# Patient Record
Sex: MALE | Race: WHITE | NOT HISPANIC OR LATINO | Employment: PART TIME | URBAN - NONMETROPOLITAN AREA
[De-identification: names, ages, dates, MRNs, and addresses within clinical notes are randomized per-mention and may not be internally consistent; named-entity substitution may affect disease eponyms.]

---
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
## 2022-02-25 ENCOUNTER — HOSPITAL ENCOUNTER (EMERGENCY)
Facility: HOSPITAL | Age: 22
Discharge: HOME | End: 2022-02-25
Admitting: EMERGENCY MEDICINE

## 2022-02-25 VITALS
WEIGHT: 238 LBS | RESPIRATION RATE: 20 BRPM | HEIGHT: 69 IN | HEART RATE: 88 BPM | BODY MASS INDEX: 35.25 KG/M2 | DIASTOLIC BLOOD PRESSURE: 92 MMHG | OXYGEN SATURATION: 99 % | SYSTOLIC BLOOD PRESSURE: 123 MMHG | TEMPERATURE: 97.8 F

## 2022-02-25 DIAGNOSIS — Z00.00 NORMAL EXAM: ICD-10-CM
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
## 2022-02-25 PROCEDURE — 99282 EMERGENCY DEPT VISIT SF MDM: CPT

## 2024-02-03 ENCOUNTER — OFFICE VISIT (OUTPATIENT)
Age: 24
End: 2024-02-03

## 2024-02-03 VITALS
HEART RATE: 91 BPM | RESPIRATION RATE: 18 BRPM | TEMPERATURE: 97.7 F | WEIGHT: 222 LBS | DIASTOLIC BLOOD PRESSURE: 72 MMHG | SYSTOLIC BLOOD PRESSURE: 112 MMHG | OXYGEN SATURATION: 98 %

## 2024-02-03 DIAGNOSIS — H61.22 IMPACTED CERUMEN OF LEFT EAR: Primary | ICD-10-CM

## 2024-02-03 ASSESSMENT — ENCOUNTER SYMPTOMS
EYE PAIN: 0
SINUS PRESSURE: 0
COLOR CHANGE: 0
SORE THROAT: 0
TROUBLE SWALLOWING: 0
CHEST TIGHTNESS: 0
ABDOMINAL DISTENTION: 0
EYE DISCHARGE: 0
WHEEZING: 0
COUGH: 0
STRIDOR: 0
ABDOMINAL PAIN: 0
SHORTNESS OF BREATH: 0

## 2024-02-03 NOTE — PROGRESS NOTES
[facility]  [facility]  [address] [place] [address] DRIVE  [facility] [id number]  Dept: [phone]  Dept Fax: [phone]  Loc: [phone]    Mani Engel is a 23 y.o. male who presents today for his medical conditions/complaints as noted below.  Mani Engel is complaining of Ear Fullness (Left )        HPI:   Ear Fullness   Pertinent negatives include no abdominal pain, coughing, neck pain, rash or sore throat.       Mani presents to the office complaining of left ear fullness and muffled hearing.  Symptoms started 1 week ago.  Denies pain or discharge.  Denies foreign body.     Past Medical History:   Diagnosis Date    Anxiety        No past surgical history on file.    Family History   Problem Relation Age of Onset    Cancer Brother        Social History     Tobacco Use    Smoking status: Never    Smokeless tobacco: Never   Substance Use Topics    Alcohol use: Not on file        Current Outpatient Medications   Medication Sig Dispense Refill    sertraline (ZOLOFT) 25 MG tablet Take 1.5 tablets by mouth daily. 45 tablet 0     No current facility-administered medications for this visit.       No Known Allergies    Health Maintenance   Topic Date Due    Hepatitis B vaccine (3 of 3 - 3-dose series) 06/16/2003    Depression Screen  Never done    HIV screen  Never done    Hepatitis C screen  Never done    DTaP/Tdap/Td vaccine (7 - Td or Tdap) 05/28/2023    Flu vaccine (1) 08/01/2023    COVID-19 Vaccine (3 - 2023-24 season) 09/01/2023    Hib vaccine  Completed    HPV vaccine  Completed    Polio vaccine  Completed    Varicella vaccine  Completed    Hepatitis A vaccine  Aged Out    Meningococcal (ACWY) vaccine  Aged Out    Pneumococcal 0-64 years Vaccine  Aged Out    Measles,Mumps,Rubella (MMR) vaccine  Discontinued       Subjective:   Review of Systems   Constitutional:  Negative for chills, fatigue and fever.   HENT:  Negative for congestion, sinus pressure, sore

## 2025-08-02 ENCOUNTER — HOSPITAL ENCOUNTER (EMERGENCY)
Age: 25
Discharge: HOME | End: 2025-08-02
Payer: COMMERCIAL

## 2025-08-02 ENCOUNTER — APPOINTMENT (OUTPATIENT)
Dept: CT IMAGING | Age: 25
End: 2025-08-02
Payer: COMMERCIAL

## 2025-08-02 VITALS
HEIGHT: 69 IN | WEIGHT: 220 LBS | HEART RATE: 94 BPM | TEMPERATURE: 97.6 F | RESPIRATION RATE: 16 BRPM | DIASTOLIC BLOOD PRESSURE: 78 MMHG | OXYGEN SATURATION: 96 % | BODY MASS INDEX: 32.58 KG/M2 | SYSTOLIC BLOOD PRESSURE: 129 MMHG

## 2025-08-02 DIAGNOSIS — R31.29 OTHER MICROSCOPIC HEMATURIA: ICD-10-CM

## 2025-08-02 DIAGNOSIS — N23 RENAL COLIC: ICD-10-CM

## 2025-08-02 DIAGNOSIS — I88.0 MESENTERIC ADENITIS: Primary | ICD-10-CM

## 2025-08-02 LAB
ALBUMIN SERPL-MCNC: 4.5 G/DL (ref 3.5–5.2)
ALP SERPL-CCNC: 64 U/L (ref 40–129)
ALT SERPL-CCNC: 26 U/L (ref 10–50)
ANION GAP SERPL CALCULATED.3IONS-SCNC: 13 MMOL/L (ref 8–16)
AST SERPL-CCNC: 28 U/L (ref 10–50)
BASOPHILS # BLD: 0 K/UL (ref 0–0.2)
BASOPHILS NFR BLD: 0.5 % (ref 0–1)
BILIRUB SERPL-MCNC: 0.7 MG/DL (ref 0.2–1.2)
BILIRUB UR QL STRIP: NEGATIVE
BUN SERPL-MCNC: 9 MG/DL (ref 6–20)
CALCIUM SERPL-MCNC: 9.8 MG/DL (ref 8.6–10)
CHLORIDE SERPL-SCNC: 104 MMOL/L (ref 98–107)
CLARITY UR: ABNORMAL
CO2 SERPL-SCNC: 24 MMOL/L (ref 22–29)
COARSE GRAN CASTS #/AREA URNS LPF: ABNORMAL /LPF (ref 0–5)
COLOR UR: ABNORMAL
CREAT SERPL-MCNC: 0.8 MG/DL (ref 0.7–1.2)
EOSINOPHIL # BLD: 0.2 K/UL (ref 0–0.6)
EOSINOPHIL NFR BLD: 2.2 % (ref 0–5)
ERYTHROCYTE [DISTWIDTH] IN BLOOD BY AUTOMATED COUNT: 12.6 % (ref 11.5–14.5)
FINE GRAN CASTS #/AREA URNS HPF: ABNORMAL /LPF (ref 0–2)
GLUCOSE SERPL-MCNC: 109 MG/DL (ref 70–99)
GLUCOSE UR STRIP.AUTO-MCNC: NEGATIVE MG/DL
HCT VFR BLD AUTO: 45.1 % (ref 42–52)
HGB BLD-MCNC: 15.2 G/DL (ref 14–18)
HGB UR STRIP.AUTO-MCNC: ABNORMAL MG/L
HYALINE CASTS #/AREA URNS LPF: ABNORMAL /LPF (ref 0–5)
IMM GRANULOCYTES # BLD: 0 K/UL
KETONES UR STRIP.AUTO-MCNC: ABNORMAL MG/DL
LEUKOCYTE ESTERASE UR QL STRIP.AUTO: ABNORMAL
LIPASE SERPL-CCNC: 25 U/L (ref 13–60)
LYMPHOCYTES # BLD: 1 K/UL (ref 1.1–4.5)
LYMPHOCYTES NFR BLD: 11.5 % (ref 20–40)
MCH RBC QN AUTO: 30.3 PG (ref 27–31)
MCHC RBC AUTO-ENTMCNC: 33.7 G/DL (ref 33–37)
MCV RBC AUTO: 90 FL (ref 80–94)
MONOCYTES # BLD: 0.6 K/UL (ref 0–0.9)
MONOCYTES NFR BLD: 6.7 % (ref 0–10)
MUCOUS THREADS URNS QL MICRO: ABNORMAL /LPF
NEUTROPHILS # BLD: 6.9 K/UL (ref 1.5–7.5)
NEUTS SEG NFR BLD: 78.6 % (ref 50–65)
NITRITE UR QL STRIP.AUTO: NEGATIVE
PH UR STRIP.AUTO: 6 [PH] (ref 5–8)
PLATELET # BLD AUTO: 248 K/UL (ref 130–400)
PMV BLD AUTO: 9.8 FL (ref 9.4–12.4)
POTASSIUM SERPL-SCNC: 3.7 MMOL/L (ref 3.5–5)
PROT SERPL-MCNC: 7.1 G/DL (ref 6.4–8.3)
PROT UR STRIP.AUTO-MCNC: 30 MG/DL
RBC # BLD AUTO: 5.01 M/UL (ref 4.7–6.1)
RBC #/AREA URNS HPF: ABNORMAL /HPF (ref 0–2)
SODIUM SERPL-SCNC: 141 MMOL/L (ref 136–145)
SP GR UR STRIP.AUTO: 1.02 (ref 1–1.03)
SQUAMOUS #/AREA URNS HPF: ABNORMAL /HPF
UROBILINOGEN UR STRIP.AUTO-MCNC: 1 E.U./DL
WBC # BLD AUTO: 8.8 K/UL (ref 4.8–10.8)
WBC #/AREA URNS HPF: ABNORMAL /HPF (ref 0–5)

## 2025-08-02 PROCEDURE — 85025 COMPLETE CBC W/AUTO DIFF WBC: CPT

## 2025-08-02 PROCEDURE — 36415 COLL VENOUS BLD VENIPUNCTURE: CPT

## 2025-08-02 PROCEDURE — 87086 URINE CULTURE/COLONY COUNT: CPT

## 2025-08-02 PROCEDURE — 80053 COMPREHEN METABOLIC PANEL: CPT

## 2025-08-02 PROCEDURE — 83690 ASSAY OF LIPASE: CPT

## 2025-08-02 PROCEDURE — 74176 CT ABD & PELVIS W/O CONTRAST: CPT

## 2025-08-02 PROCEDURE — 99284 EMERGENCY DEPT VISIT MOD MDM: CPT

## 2025-08-02 PROCEDURE — 81001 URINALYSIS AUTO W/SCOPE: CPT

## 2025-08-02 RX ORDER — ONDANSETRON 4 MG/1
4 TABLET, ORALLY DISINTEGRATING ORAL 3 TIMES DAILY PRN
Qty: 21 TABLET | Refills: 0 | Status: SHIPPED | OUTPATIENT
Start: 2025-08-02

## 2025-08-02 NOTE — ED PROVIDER NOTES
[facility] EMERGENCY DEPARTMENT  EMERGENCY DEPARTMENT ENCOUNTER      Pt Name: Mani Engel  MRN: [id number]  Birthdate 2000  Date of evaluation: 8/2/2025  Provider: Ijeoma Allison PA-C    CHIEF COMPLAINT       Chief Complaint   Patient presents with    Abdominal Pain     Left side abd pain onset this morning    Vomiting    Urinary Frequency     HISTORY OF PRESENT ILLNESS   (Location/Symptom, Timing/Onset,Context/Setting, Quality, Duration, Modifying Factors, Severity)  Note limiting factors.   HPI    Mani Engel is a 25 y.o. male with a past medical history significant for anxiety who presents to the emergency department with a chief complaint of left-sided abdominal pain, nausea, vomiting.  Patient states that shortly after waking up and using the restroom this morning he started to have a sharp stabbing pain in the left side of his abdomen.  He initially thought that it was gas pain, so he tried to strain to have a bowel movement, and it did not change anything, he was not able to have a bowel movement, and he continued to feel very poorly.  He started vomiting, had multiple episodes of vomiting this morning, and then they decided to come to the ER for further evaluation.  On route, he had to pull over because he had an overwhelming sense of urinary urgency, was able to pull over at a [employer], he vomited once there, and urinated a small amount, then everything started to get better.  His pain is now gone, he has not had any further episodes of vomiting, and he feels much improved.  They were concerned that maybe he had a kidney stone, but now they question if there is anything wrong at all because the symptoms went away.  Denies any known history of kidney stone, but he states he had a similar pain a couple of years ago, but it only lasted for a short time and he did not have the associated vomiting.    Nursing Notes were reviewed.    REVIEW OF SYSTEMS    (2-9 systems for level 4, 10 or more for  additional testing.    Patient did have white blood cell load, and though he does not have bacteria, will initiate Keflex out of an abundance of caution while awaiting urine culture.  He is advised to follow-up on this result on MyChart and if the culture comes back negative, he is encouraged to discontinue his antibiotic.    Patient remains feeling well, denies any pain, nausea, vomiting, and is ready for discharge at this time    See below for follow-up instructions and discharge medications.  Patient instructed to return to the ED for any new or worsening symptoms or concerns.  Discussed signs and symptoms most concerning that necessitate immediate return to the ED for reevaluation.  Patient verbalizes understanding of return precautions and follow-up recommendations.  Is given opportunity to ask any questions they may have.  Patient denies further question/comments/concerns, is understanding and agreeable to stated plan, and is discharged home in stable condition.     CONSULTS:  None    PROCEDURES:  Unless otherwise notedbelow, none     Procedures      FINAL IMPRESSION     1. Mesenteric adenitis    2. Renal colic    3. Other microscopic hematuria          DISPOSITION/PLAN   DISPOSITION Decision To Discharge 08/02/2025 11:37:22 AM   DISPOSITION CONDITION Stable           No notes of EC Admission Criteria type on file.    PATIENT REFERRED TO:  Jimmy Capone MD  [address] [facility] [address]  [place] [address]  [phone]    Schedule an appointment as soon as possible for a visit in 2 days      Junior Jones MD  [address] [place] [address]  [phone]    Schedule an appointment as soon as possible for a visit in 2 days      [facility] Emergency Department  [address] [place] [place] [address]  [phone]  Go to   If symptoms worsen      DISCHARGE MEDICATIONS:  New Prescriptions    CEPHALEXIN (KEFLEX) 250 MG CAPSULE    Take 2 capsules by mouth 4 times daily for 10 days    ONDANSETRON

## 2025-08-02 NOTE — DISCHARGE INSTRUCTIONS
Please take your medications as prescribed, and follow-up with your primary care for further evaluation recommendations.  Please return to the ED for any new or worsening symptoms or concerns    Given the blood in the urine that was noted today, though we suspect that you likely had a kidney stone, it is important to call urology to schedule a follow-up appointment to ensure that this resolves.  If the blood in the urine persists, they may want to do additional testing at a later date

## 2025-08-03 LAB — BACTERIA UR CULT: NO GROWTH

## 2025-08-04 ENCOUNTER — TELEPHONE (OUTPATIENT)
Dept: UROLOGY | Age: 25
End: 2025-08-04

## 2025-08-04 LAB — BACTERIA UR CULT: NORMAL
